# Patient Record
Sex: MALE | Race: WHITE
[De-identification: names, ages, dates, MRNs, and addresses within clinical notes are randomized per-mention and may not be internally consistent; named-entity substitution may affect disease eponyms.]

---

## 2019-01-19 ENCOUNTER — HOSPITAL ENCOUNTER (EMERGENCY)
Dept: HOSPITAL 96 - M.ERS | Age: 24
Discharge: HOME | End: 2019-01-19
Payer: COMMERCIAL

## 2019-01-19 VITALS — HEIGHT: 77 IN | WEIGHT: 270 LBS | BODY MASS INDEX: 31.88 KG/M2

## 2019-01-19 VITALS — SYSTOLIC BLOOD PRESSURE: 136 MMHG | DIASTOLIC BLOOD PRESSURE: 69 MMHG

## 2019-01-19 DIAGNOSIS — R07.89: ICD-10-CM

## 2019-01-19 DIAGNOSIS — E05.90: ICD-10-CM

## 2019-01-19 DIAGNOSIS — Z88.0: ICD-10-CM

## 2019-01-19 DIAGNOSIS — F17.200: ICD-10-CM

## 2019-01-19 DIAGNOSIS — F41.9: Primary | ICD-10-CM

## 2019-01-19 LAB
ABSOLUTE BASOPHILS: 0 THOU/UL (ref 0–0.2)
ABSOLUTE EOSINOPHILS: 0.2 THOU/UL (ref 0–0.7)
ABSOLUTE MONOCYTES: 0.8 THOU/UL (ref 0–1.2)
ALBUMIN SERPL-MCNC: 4 G/DL (ref 3.4–5)
ALP SERPL-CCNC: 72 U/L (ref 46–116)
ALT SERPL-CCNC: 25 U/L (ref 30–65)
ANION GAP SERPL CALC-SCNC: 4 MMOL/L (ref 7–16)
AST SERPL-CCNC: 15 U/L (ref 15–37)
BASOPHILS NFR BLD AUTO: 0.4 %
BILIRUB SERPL-MCNC: 0.3 MG/DL
BILIRUB UR-MCNC: NEGATIVE MG/DL
BUN SERPL-MCNC: 16 MG/DL (ref 7–18)
CALCIUM SERPL-MCNC: 8.9 MG/DL (ref 8.5–10.1)
CHLORIDE SERPL-SCNC: 104 MMOL/L (ref 98–107)
CO2 SERPL-SCNC: 32 MMOL/L (ref 21–32)
COLOR UR: YELLOW
CREAT SERPL-MCNC: 1.1 MG/DL (ref 0.6–1.3)
EOSINOPHIL NFR BLD: 2.6 %
GLUCOSE SERPL-MCNC: 99 MG/DL (ref 70–99)
GRANULOCYTES NFR BLD MANUAL: 54.2 %
HCT VFR BLD CALC: 40.4 % (ref 42–52)
HGB BLD-MCNC: 13.6 GM/DL (ref 14–18)
KETONES UR STRIP-MCNC: NEGATIVE MG/DL
LYMPHOCYTES # BLD: 2.3 THOU/UL (ref 0.8–5.3)
LYMPHOCYTES NFR BLD AUTO: 32.1 %
MCH RBC QN AUTO: 27.6 PG (ref 26–34)
MCHC RBC AUTO-ENTMCNC: 33.7 G/DL (ref 28–37)
MCV RBC: 81.9 FL (ref 80–100)
MONOCYTES NFR BLD: 10.7 %
MPV: 7.6 FL. (ref 7.2–11.1)
NEUTROPHILS # BLD: 3.9 THOU/UL (ref 1.6–8.1)
NUCLEATED RBCS: 0 /100WBC
PLATELET COUNT*: 275 THOU/UL (ref 150–400)
POTASSIUM SERPL-SCNC: 3.9 MMOL/L (ref 3.5–5.1)
PROT SERPL-MCNC: 7 G/DL (ref 6.4–8.2)
PROT UR QL STRIP: NEGATIVE
RBC # BLD AUTO: 4.93 MIL/UL (ref 4.5–6)
RBC # UR STRIP: NEGATIVE /UL
RDW-CV: 12.8 % (ref 10.5–14.5)
SODIUM SERPL-SCNC: 140 MMOL/L (ref 136–145)
SP GR UR STRIP: >= 1.03 (ref 1–1.03)
URINE CLARITY: CLEAR
URINE GLUCOSE-RANDOM: NEGATIVE
URINE LEUKOCYTES-REFLEX: NEGATIVE
URINE NITRITE-REFLEX: NEGATIVE
UROBILINOGEN UR STRIP-ACNC: 0.2 E.U./DL (ref 0.2–1)
WBC # BLD AUTO: 7.2 THOU/UL (ref 4–11)

## 2019-01-20 NOTE — EKG
[place] [address]
Phone:  [phone]                     ELECTROCARDIOGRAM REPORT      
_______________________________________________________________________________
 
Name:       MAYNORANATOLYESTHER CORREA              Room:                      [facility]#:  [id number]      Account #:      [id number]  
Admission:  19     Attend Phys:                         
Discharge:  19     Date of Birth:  [birth year]  
         Report #: [id number]
    [id number]
_______________________________________________________________________________
THIS REPORT FOR:  //name//                      
 
                         [facility] ED
                                       
Test Date:    2019               Test Time:    19:54:46
Pat Name:     ANATOLY MCGUIRE              Department:   
Patient ID:   [id number]            Room:          
Gender:       M                        Technician:   GUEVARA EMANUEL
:          1995               Requested By: Naz Coleman
Order Number: [id number]    Yeny MD:   Erasmo Hassan
                                 Measurements
Intervals                              Axis          
Rate:         101                      P:            72
VA:           124                      QRS:          77
QRSD:         79                       T:            60
QT:           334                                    
QTc:          433                                    
                           Interpretive Statements
Sinus tachycardia
Minimal ST depression, diffuse leads
No previous ECG available for comparison
 
Electronically Signed On 2019 12:31:12 CST by Erasmo Hassan
[email or web address]
 
 
 
 
 
 
 
 
 
 
 
 
 
 
 
 
 
 
 
  <ELECTRONICALLY SIGNED>
                                           By: Erasmo Hassan MD, [facility]   
  19     1231
D: 19 1952   _____________________________________
T: 19   Erasmo Hassan MD, FACC     /EPI

## 2019-10-06 ENCOUNTER — HOSPITAL ENCOUNTER (EMERGENCY)
Dept: HOSPITAL 96 - M.ERS | Age: 24
Discharge: HOME | End: 2019-10-06
Payer: COMMERCIAL

## 2019-10-06 VITALS — DIASTOLIC BLOOD PRESSURE: 78 MMHG | SYSTOLIC BLOOD PRESSURE: 161 MMHG

## 2019-10-06 VITALS — WEIGHT: 285.01 LBS | HEIGHT: 77 IN | BODY MASS INDEX: 33.65 KG/M2
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
## 2019-10-06 DIAGNOSIS — F41.9: ICD-10-CM

## 2019-10-06 DIAGNOSIS — S61.211A: Primary | ICD-10-CM

## 2019-10-06 DIAGNOSIS — Z88.0: ICD-10-CM

## 2019-10-06 DIAGNOSIS — E03.9: ICD-10-CM

## 2022-06-23 NOTE — EKG
[place] [address]
Phone:  [phone]                     ELECTROCARDIOGRAM REPORT      
_______________________________________________________________________________
 
Name:       ANATOLY MCGUIRE              Room:                      [facility]#:  [id number]      Account #:      [id number]  
Admission:  10/06/19     Attend Phys:                         
Discharge:  10/06/19     Date of Birth:  [birth year]  
         Report #: [id number]
    [id number]
_______________________________________________________________________________
THIS REPORT FOR:  //name//                      
 
                         [facility] ED
                                       
Test Date:    2019-10-06               Test Time:    21:20:58
Pat Name:     ANATOLY MCGUIRE              Department:   
Patient ID:   [id number]            Room:          
Gender:       M                        Technician:   FRANCISCO J
:          1995               Requested By: Nietsh Goldstein
Order Number: [id number] MD:   Jeffy Luna
                                 Measurements
Intervals                              Axis          
Rate:         73                       P:            70
SC:           72                       QRS:          77
QRSD:         105                      T:            63
QT:           374                                    
QTc:          413                                    
                           Interpretive Statements
Sinus rhythm
Short SC interval
Compared to ECG 2019 19:54:46
Short SC interval now present
Sinus tachycardia no longer present
ST (T wave) deviation no longer present
 
Electronically Signed On 10-7-2019 16:22:45 CDT by Jeffy Luna
[email or web address]
 
 
 
 
 
 
 
 
 
 
 
 
 
 
 
 
  <ELECTRONICALLY SIGNED>
                                           By: Jeffy Luna MD, [facility]     
  10/07/19     1622
D: 10/06/19 2120   _____________________________________
T: 10/06/19 2120   Jeffy Luna MD, [facility]       /EPI (E4) spontaneous